# Patient Record
Sex: FEMALE | Race: WHITE | Employment: OTHER | URBAN - METROPOLITAN AREA
[De-identification: names, ages, dates, MRNs, and addresses within clinical notes are randomized per-mention and may not be internally consistent; named-entity substitution may affect disease eponyms.]

---

## 2018-08-13 ENCOUNTER — HOSPITAL ENCOUNTER (EMERGENCY)
Age: 43
Discharge: HOME | End: 2018-08-14
Payer: COMMERCIAL

## 2018-08-13 VITALS
SYSTOLIC BLOOD PRESSURE: 122 MMHG | RESPIRATION RATE: 16 BRPM | DIASTOLIC BLOOD PRESSURE: 82 MMHG | OXYGEN SATURATION: 100 % | HEIGHT: 63 IN | BODY MASS INDEX: 20.02 KG/M2 | HEART RATE: 81 BPM | TEMPERATURE: 97.8 F | WEIGHT: 113 LBS

## 2018-08-13 DIAGNOSIS — S80.211A ABRASION, RIGHT KNEE, INITIAL ENCOUNTER: ICD-10-CM

## 2018-08-13 DIAGNOSIS — L03.115 CELLULITIS OF RIGHT KNEE: Primary | ICD-10-CM
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
## 2018-08-13 PROCEDURE — 99283 EMERGENCY DEPT VISIT LOW MDM: CPT

## 2018-08-13 RX ORDER — LOSARTAN POTASSIUM 50 MG/1
50 TABLET ORAL DAILY
COMMUNITY

## 2018-08-13 RX ORDER — ROSUVASTATIN CALCIUM 40 MG/1
40 TABLET, COATED ORAL DAILY
COMMUNITY

## 2018-08-13 ASSESSMENT — PAIN DESCRIPTION - ORIENTATION: ORIENTATION: RIGHT

## 2018-08-13 ASSESSMENT — PAIN DESCRIPTION - ONSET: ONSET: SUDDEN

## 2018-08-13 ASSESSMENT — PAIN DESCRIPTION - LOCATION: LOCATION: KNEE

## 2018-08-13 ASSESSMENT — PAIN DESCRIPTION - PAIN TYPE: TYPE: ACUTE PAIN

## 2018-08-13 ASSESSMENT — PAIN SCALES - GENERAL: PAINLEVEL_OUTOF10: 7

## 2018-08-13 ASSESSMENT — PAIN DESCRIPTION - FREQUENCY: FREQUENCY: CONTINUOUS

## 2018-08-13 ASSESSMENT — PAIN DESCRIPTION - DESCRIPTORS: DESCRIPTORS: ACHING;BURNING

## 2018-08-14 ENCOUNTER — APPOINTMENT (OUTPATIENT)
Dept: GENERAL RADIOLOGY | Age: 43
End: 2018-08-14
Payer: COMMERCIAL

## 2018-08-14 PROCEDURE — 6370000000 HC RX 637 (ALT 250 FOR IP): Performed by: PHYSICIAN ASSISTANT

## 2018-08-14 PROCEDURE — 73562 X-RAY EXAM OF KNEE 3: CPT

## 2018-08-14 PROCEDURE — 6360000002 HC RX W HCPCS: Performed by: PHYSICIAN ASSISTANT

## 2018-08-14 PROCEDURE — 90715 TDAP VACCINE 7 YRS/> IM: CPT | Performed by: PHYSICIAN ASSISTANT

## 2018-08-14 PROCEDURE — 90471 IMMUNIZATION ADMIN: CPT | Performed by: PHYSICIAN ASSISTANT

## 2018-08-14 RX ORDER — CLINDAMYCIN HYDROCHLORIDE 300 MG/1
300 CAPSULE ORAL 3 TIMES DAILY
Qty: 21 CAPSULE | Refills: 0 | Status: SHIPPED | OUTPATIENT
Start: 2018-08-14 | End: 2018-08-21

## 2018-08-14 RX ORDER — CLINDAMYCIN HYDROCHLORIDE 150 MG/1
300 CAPSULE ORAL ONCE
Status: COMPLETED | OUTPATIENT
Start: 2018-08-14 | End: 2018-08-14

## 2018-08-14 RX ADMIN — TETANUS TOXOID, REDUCED DIPHTHERIA TOXOID AND ACELLULAR PERTUSSIS VACCINE, ADSORBED 0.5 ML: 5; 2.5; 8; 8; 2.5 SUSPENSION INTRAMUSCULAR at 00:48

## 2018-08-14 RX ADMIN — CLINDAMYCIN HYDROCHLORIDE 300 MG: 150 CAPSULE ORAL at 00:49

## 2018-08-14 NOTE — ED PROVIDER NOTES
Details   rosuvastatin (CRESTOR) 40 MG tablet Take 40 mg by mouth dailyHistorical Med      losartan (COZAAR) 50 MG tablet Take 50 mg by mouth dailyHistorical Med      cyclobenzaprine (FLEXERIL) 10 MG tablet Take 10 mg by mouth 3 times daily as needed for Muscle spasmsHistorical Med      PARoxetine (PAXIL) 40 MG tablet Take 40 mg by mouth every morning. diazepam (VALIUM) 10 MG tablet Take 10 mg by mouth 2 times daily. ipratropium-albuterol (DUONEB) 0.5-2.5 (3) MG/3ML SOLN nebulizer solution Take 1 vial by nebulization every 4 hours as needed for Shortness of Breath. Ranitidine HCl (ZANTAC 150 MAXIMUM STRENGTH PO) Take  by mouth. albuterol (PROVENTIL HFA;VENTOLIN HFA) 108 (90 BASE) MCG/ACT inhaler Inhale 2 puffs into the lungs every 6 hours as needed. Review of Systems:  Review of Systems  Positives and Pertinent negatives as per HPI. Except as noted above in the ROS, problem specific ROS was completed and is negative. Physical Exam:  Physical Exam   Constitutional: She is oriented to person, place, and time. She appears well-developed and well-nourished. HENT:   Head: Normocephalic and atraumatic. Right Ear: External ear normal.   Left Ear: External ear normal.   Eyes: Conjunctivae are normal. Right eye exhibits no discharge. Left eye exhibits no discharge. No scleral icterus. Neck: Normal range of motion. Cardiovascular: Normal rate, regular rhythm and normal heart sounds. Pulmonary/Chest: Effort normal and breath sounds normal.   Musculoskeletal: Normal range of motion. She exhibits tenderness. She exhibits no edema or deformity. Patient with central area of scab measuring 2 cm. Beyond that she has about 3 cm peripherally of erythema. No knee effusion. No instability. Neurological: She is alert and oriented to person, place, and time. Skin: Skin is warm and dry. Psychiatric: She has a normal mood and affect.  Her behavior is normal. Judgment and thought of osteomyelitis. Erythema suggests he is an emerging cellulitis. She is given versus clindamycin 300 mg here in the emergency room. The patient will be discharged with clindamycin 300 mg 3 times a day. Wound care provided while in the emergency room. Patient can ambulate. Recommending wound care and elevation as as possible. The patient and  express understanding of the diagnosis and treatment plan. The patient tolerated their visit well. I saw the patient independently with physician available for consultation as needed. The patient and / or the family were informed of the results of any tests, a time was given to answer questions, a plan was proposed and they agreed with plan. CLINICAL IMPRESSION:  1. Cellulitis of right knee    2. Abrasion, right knee, initial encounter    3. Fall on same level from slipping, tripping or stumbling, initial encounter        DISPOSITION Decision To Discharge 08/14/2018 01:01:38 AM      PATIENT REFERRED TO:  Wilson Tang [address] [facility] [address]  [phone]    Schedule an appointment as soon as possible for a visit in 2 days      [facility] ED  [place] [id number]  [phone]  Go to   If symptoms worsen      DISCHARGE MEDICATIONS:  Discharge Medication List as of 8/14/2018  1:02 AM      START taking these medications    Details   clindamycin (CLEOCIN) 300 MG capsule Take 1 capsule by mouth 3 times daily for 7 days, Disp-21 capsule, R-0Patient states is a clindamycin without adverse event previously. Print             DISCONTINUED MEDICATIONS:  Discharge Medication List as of 8/14/2018  1:02 AM      STOP taking these medications       HYDROcodone-acetaminophen (NORCO) 5-325 MG per tablet Comments:   Reason for Stopping:         Fish Oil-Cholecalciferol (FISH OIL + D3 PO) Comments:   Reason for Stopping:                      (Please note the MDM and HPI sections of this note were completed with a voice recognition program.  Efforts were made to edit the dictations but occasionally words are mis-transcribed.)    Electronically signed, Jatinder Nguyen PA-C,          Jatinder Nguyen PA-C  08/14/18 [id number]

## 2024-07-01 ENCOUNTER — APPOINTMENT (OUTPATIENT)
Dept: GENERAL RADIOLOGY | Age: 49
End: 2024-07-01
Payer: COMMERCIAL

## 2024-07-01 ENCOUNTER — HOSPITAL ENCOUNTER (EMERGENCY)
Age: 49
Discharge: HOME | End: 2024-07-01
Payer: COMMERCIAL

## 2024-07-01 VITALS
RESPIRATION RATE: 20 BRPM | DIASTOLIC BLOOD PRESSURE: 92 MMHG | WEIGHT: 102 LBS | BODY MASS INDEX: 18.77 KG/M2 | HEART RATE: 83 BPM | OXYGEN SATURATION: 99 % | TEMPERATURE: 97.8 F | SYSTOLIC BLOOD PRESSURE: 170 MMHG | HEIGHT: 62 IN

## 2024-07-01 DIAGNOSIS — M25.571 ACUTE RIGHT ANKLE PAIN: Primary | ICD-10-CM

## 2024-07-01 PROCEDURE — 73610 X-RAY EXAM OF ANKLE: CPT

## 2024-07-01 PROCEDURE — 73630 X-RAY EXAM OF FOOT: CPT

## 2024-07-01 PROCEDURE — 99283 EMERGENCY DEPT VISIT LOW MDM: CPT

## 2024-07-01 ASSESSMENT — PAIN DESCRIPTION - ORIENTATION: ORIENTATION: RIGHT

## 2024-07-01 ASSESSMENT — PAIN DESCRIPTION - LOCATION: LOCATION: ANKLE;FOOT

## 2024-07-01 ASSESSMENT — PAIN SCALES - GENERAL: PAINLEVEL_OUTOF10: 7

## 2024-07-01 ASSESSMENT — PAIN - FUNCTIONAL ASSESSMENT: PAIN_FUNCTIONAL_ASSESSMENT: 0-10

## 2024-07-01 NOTE — ED PROVIDER NOTES
Reason for Exam: rolled right ankle; ORDERING SYSTEM PROVIDED HISTORY: rolled foot and ankle TECHNOLOGIST PROVIDED HISTORY: Reason for exam:->rolled foot and ankle Reason for Exam: rolled foot and ankle FINDINGS: There is no evidence of acute fracture.  There is normal alignment of the tibiotalar and tarsometatarsal joints.  No acute joint abnormality.  No focal osseous lesion. No focal soft tissue abnormality.     No acute osseous abnormality.     XR ANKLE RIGHT (MIN 3 VIEWS)    Result Date: 7/1/2024  EXAMINATION: THREE XRAY VIEWS OF THE RIGHT ANKLE;   XRAY VIEWS OF THE RIGHT FOOT 7/1/2024 11:46 am COMPARISON: None. HISTORY: ORDERING SYSTEM PROVIDED HISTORY: rolled right ankle TECHNOLOGIST PROVIDED HISTORY: Reason for exam:->rolled right ankle Reason for Exam: rolled right ankle; ORDERING SYSTEM PROVIDED HISTORY: rolled foot and ankle TECHNOLOGIST PROVIDED HISTORY: Reason for exam:->rolled foot and ankle Reason for Exam: rolled foot and ankle FINDINGS: There is no evidence of acute fracture.  There is normal alignment of the tibiotalar and tarsometatarsal joints.  No acute joint abnormality.  No focal osseous lesion. No focal soft tissue abnormality.     No acute osseous abnormality.       ED COURSE  48-year-old female presenting the emergency department for evaluation of atraumatic right ankle and foot pain.  Pain began after walking on a gravel driveway 1 week ago.  Has not taken any medications for symptom relief at this time.  Fairly benign physical exam.  X-ray imaging negative for any acute fractures.  Vital signs stable and reassuring.  Educated patient on use of OTC Tylenol as needed for pain control.  Instructed patient to apply ice to general area for 20-30 minutes at a time 3-4 times a day.  Risk management discussed and shared decision making had with patient and/or surrogate. All questions were answered. Patient will follow up with primary care provider and orthopedic provider for further

## 2024-07-01 NOTE — DISCHARGE INSTRUCTIONS
You can use OTC Tylenol every 4-6 hours as needed for pain control.  Apply ice to the general area and elevate the extremity whenever you have a chance to.  Follow-up with primary care provider.